# Patient Record
Sex: MALE | Race: BLACK OR AFRICAN AMERICAN
[De-identification: names, ages, dates, MRNs, and addresses within clinical notes are randomized per-mention and may not be internally consistent; named-entity substitution may affect disease eponyms.]

---

## 2020-09-01 ENCOUNTER — HOSPITAL ENCOUNTER (INPATIENT)
Dept: HOSPITAL 15 - ER | Age: 25
LOS: 3 days | Discharge: HOME | DRG: 52 | End: 2020-09-04
Attending: INTERNAL MEDICINE | Admitting: NURSE PRACTITIONER
Payer: MEDICAID

## 2020-09-01 VITALS — HEIGHT: 78 IN | BODY MASS INDEX: 17.45 KG/M2 | WEIGHT: 150.8 LBS

## 2020-09-01 DIAGNOSIS — Z83.3: ICD-10-CM

## 2020-09-01 DIAGNOSIS — Z53.9: ICD-10-CM

## 2020-09-01 DIAGNOSIS — Z82.49: ICD-10-CM

## 2020-09-01 DIAGNOSIS — R82.71: ICD-10-CM

## 2020-09-01 DIAGNOSIS — Z79.899: ICD-10-CM

## 2020-09-01 DIAGNOSIS — Z91.19: ICD-10-CM

## 2020-09-01 DIAGNOSIS — R00.1: ICD-10-CM

## 2020-09-01 DIAGNOSIS — G93.49: Primary | ICD-10-CM

## 2020-09-01 DIAGNOSIS — F20.2: ICD-10-CM

## 2020-09-01 DIAGNOSIS — F12.90: ICD-10-CM

## 2020-09-01 DIAGNOSIS — R56.9: ICD-10-CM

## 2020-09-01 LAB
ALBUMIN SERPL-MCNC: 4.2 G/DL (ref 3.4–5)
ALP SERPL-CCNC: 48 U/L (ref 45–117)
ALT SERPL-CCNC: 89 U/L (ref 16–61)
ANION GAP SERPL CALCULATED.3IONS-SCNC: 5 MMOL/L (ref 5–15)
BILIRUB SERPL-MCNC: 1.1 MG/DL (ref 0.2–1)
BUN SERPL-MCNC: 16 MG/DL (ref 7–18)
BUN/CREAT SERPL: 13.3
CALCIUM SERPL-MCNC: 9.2 MG/DL (ref 8.5–10.1)
CHLORIDE SERPL-SCNC: 104 MMOL/L (ref 98–107)
CO2 SERPL-SCNC: 29 MMOL/L (ref 21–32)
GLUCOSE SERPL-MCNC: 82 MG/DL (ref 74–106)
HCT VFR BLD AUTO: 42.3 % (ref 41–53)
HGB BLD-MCNC: 14 G/DL (ref 13.5–17.5)
MCH RBC QN AUTO: 27.3 PG (ref 28–32)
MCV RBC AUTO: 82.8 FL (ref 80–100)
NRBC BLD QL AUTO: 0.2 %
POTASSIUM SERPL-SCNC: 3.8 MMOL/L (ref 3.5–5.1)
PROT SERPL-MCNC: 8.3 G/DL (ref 6.4–8.2)
SODIUM SERPL-SCNC: 138 MMOL/L (ref 136–145)

## 2020-09-01 PROCEDURE — 84443 ASSAY THYROID STIM HORMONE: CPT

## 2020-09-01 PROCEDURE — 80320 DRUG SCREEN QUANTALCOHOLS: CPT

## 2020-09-01 PROCEDURE — 80053 COMPREHEN METABOLIC PANEL: CPT

## 2020-09-01 PROCEDURE — 84484 ASSAY OF TROPONIN QUANT: CPT

## 2020-09-01 PROCEDURE — 80307 DRUG TEST PRSMV CHEM ANLYZR: CPT

## 2020-09-01 PROCEDURE — 85025 COMPLETE CBC W/AUTO DIFF WBC: CPT

## 2020-09-01 PROCEDURE — 82805 BLOOD GASES W/O2 SATURATION: CPT

## 2020-09-01 PROCEDURE — 96365 THER/PROPH/DIAG IV INF INIT: CPT

## 2020-09-01 PROCEDURE — 80048 BASIC METABOLIC PNL TOTAL CA: CPT

## 2020-09-01 PROCEDURE — 36415 COLL VENOUS BLD VENIPUNCTURE: CPT

## 2020-09-01 PROCEDURE — 96361 HYDRATE IV INFUSION ADD-ON: CPT

## 2020-09-01 PROCEDURE — 93005 ELECTROCARDIOGRAM TRACING: CPT

## 2020-09-01 PROCEDURE — 87086 URINE CULTURE/COLONY COUNT: CPT

## 2020-09-01 PROCEDURE — 93306 TTE W/DOPPLER COMPLETE: CPT

## 2020-09-01 PROCEDURE — 81001 URINALYSIS AUTO W/SCOPE: CPT

## 2020-09-01 PROCEDURE — 71045 X-RAY EXAM CHEST 1 VIEW: CPT

## 2020-09-01 PROCEDURE — 82542 COL CHROMOTOGRAPHY QUAL/QUAN: CPT

## 2020-09-01 PROCEDURE — 87081 CULTURE SCREEN ONLY: CPT

## 2020-09-01 PROCEDURE — 85610 PROTHROMBIN TIME: CPT

## 2020-09-01 PROCEDURE — 80185 ASSAY OF PHENYTOIN TOTAL: CPT

## 2020-09-01 PROCEDURE — 70551 MRI BRAIN STEM W/O DYE: CPT

## 2020-09-01 PROCEDURE — 70450 CT HEAD/BRAIN W/O DYE: CPT

## 2020-09-01 PROCEDURE — 83735 ASSAY OF MAGNESIUM: CPT

## 2020-09-01 PROCEDURE — 84439 ASSAY OF FREE THYROXINE: CPT

## 2020-09-01 PROCEDURE — 36600 WITHDRAWAL OF ARTERIAL BLOOD: CPT

## 2020-09-01 PROCEDURE — 84481 FREE ASSAY (FT-3): CPT

## 2020-09-02 VITALS — DIASTOLIC BLOOD PRESSURE: 82 MMHG | SYSTOLIC BLOOD PRESSURE: 132 MMHG

## 2020-09-02 LAB
ALCOHOL, URINE: 5 MG/DL (ref 0–10)
AMPHETAMINES UR QL SCN: NEGATIVE
BARBITURATES UR QL SCN: NEGATIVE
BENZODIAZ UR QL SCN: NEGATIVE
BZE UR QL SCN: NEGATIVE
CANNABINOIDS UR QL SCN: POSITIVE
INR PPP: 1.03 (ref 0.9–1.15)
OPIATES UR QL SCN: NEGATIVE
PCP UR QL SCN: NEGATIVE
T3FREE SERPL-MCNC: 2.59 PG/ML (ref 2.3–4.2)
T4 FREE SERPL-MCNC: 1.33 NG/DL (ref 0.89–1.76)

## 2020-09-02 RX ADMIN — DOPAMINE HYDROCHLORIDE IN DEXTROSE SCH MLS/HR: 1.6 INJECTION, SOLUTION INTRAVENOUS at 03:42

## 2020-09-02 RX ADMIN — FAMOTIDINE SCH MG: 20 TABLET, FILM COATED ORAL at 21:53

## 2020-09-02 RX ADMIN — DOPAMINE HYDROCHLORIDE IN DEXTROSE SCH MLS/HR: 1.6 INJECTION, SOLUTION INTRAVENOUS at 19:44

## 2020-09-02 RX ADMIN — FAMOTIDINE SCH MG: 20 TABLET, FILM COATED ORAL at 10:35

## 2020-09-02 NOTE — NUR
Opening Shift Note

Assumed care of patient, awake and alert.  No S/S of distress/SOB or pain noted. Bed is in 
lowest locked position with bed rails up x2 and call light is within reach of the patient.  
Dopamine drip infusing as ordered.  Seizure precautions in place with bed rails padded 
bilaterally for safety. Instructed on POC and to call for assist PRN.

## 2020-09-02 NOTE — NUR
Cardiologist updated on pt's status

Spoke to Dr Tomlin on the phone re pt's current status. Received new orders including 
cancellation of PPI procedure until further evaluation. Order carried out. Notified house 
sup of change of status.

## 2020-09-02 NOTE — NUR
pt received in [facility]

pt slow to respond to questions. pt would not answer whether he understands procedure and 
was unable to give medical history or what medications he takes. however, pt was able to 
verbalize and give his mother's phone number and nodded and states, "okay" for RN to talk to 
his mother on the phone about his medical history.

## 2020-09-02 NOTE — NUR
obtained pt history from family

met pt's mother, Felicia, in [facility]. pt's mother provided brief medical history and 
medication that patient takes.  mother verbalized understanding of pt's status. all 
questions and concerns addressed as best as possible from a RN standpoint.

## 2020-09-02 NOTE — NUR
Acute Otitis Media with Infection (Child)    Your child has a middle ear infection (acute otitis media). It is caused by bacteria or fungi. The middle ear is the space behind the eardrum. The eustachian tube connects the ear to the nasal passage. The eustachian tubes help drain fluid from the ears. They also keep the air pressure equal inside and outside the ears. These tubes are shorter and more horizontal in children. This makes it more likely for the tubes to become blocked. A blockage lets fluid and pressure build up in the middle ear. Bacteria or fungi can grow in this fluid and cause an ear infection. This infection is commonly known as an earache.  The main symptom of an ear infection is ear pain. Other symptoms may include pulling at the ear, being more fussy than usual, decreased appetie, vomiting or diarrhea.Your child’s hearing may also be affected. Your child may have had a respiratory infection first.  An ear infection may clear up on its own. Or your child may need to take medicine. After the infection goes away, your child may still have fluid in the middle ear. It may take weeks or months for this fluid to go away. During that time, your child may have temporary hearing loss. But all other symptoms of the earache should be gone.  Home care  Follow these guidelines when caring for your child at home:  · The health care provider will likely prescribe medicines for pain. The provider may also prescribe antibiotics or antifungals to treat the infection. These may be liquid medicines to give by mouth. Or they may be ear drops. Follow the provider’s instructions for giving these medicines to your child.  · Because ear infections can clear up on their own, the provider may suggest waiting for a few days before giving your child medicines for infection.  · To reduce pain, have your child rest in an upright position. Hot or cold compresses held against the ear may help ease pain.  · Keep the ear dry. Have  CAME ON GURNEY FROM ER, RECEIVED PATIENT ALERT AND ORIENTED X4, NOT IN DISTRESS, CLEAR 
SOUNDS IN BILATERAL UPPER AND LOWER LUNG SOUNDS, RR=16 RYD=040%, DEEP BREATHING AND COUGHING 
WAS ENCOURAGED, DEMONSTRATED AND VERBALIZED UNDERSTANDING, DENIED SHORT OF BREATH AND CHEST 
PAIN AT THIS MOMENT, SB AND SR R=57-68 ON TELE MONITOR, ABDOMEN SOFT WITH ACTIVE BS, LAST 
BM=9/1/20 AS REPORTED, GENERAL SKIN DRY AND INTACT, RADIAL AND PEDAL PULSES PALPABLE, CAP 
REFILL <3 SECONDS, RESTING ON BED, DENIED PAIN, HEAD OF BED ELEVATED, BED ON LOW POSITION, 
RAILS UP X2, CALL LIGHT ON REACH, INFUSING DOPAMINE DRIP ON LT AC IV SITE AS ORDERED, VS 
T=98.4 RR=18, SAT=100%, P=53, XZ=635/82, WILL CONTINUE MONITORING. your child wear a shower cap when bathing.  To help prevent future infections:  · Avoid smoking near your child. Secondhand smoke raises the risk for ear infections in children.  · Make sure your child gets all appropriate vaccinations.  · Do not bottle feed while your baby is lying on his or her back. (This position can cause  middle ear infections because it allows milk to run into the eustacian tubes.)      · If you breastfeed ccontinue until your child is 6-12 months of age.  To apply ear drops:  1. Put the bottle in warm water if the medicine is kept in the refrigerator. Cold drops in the ear are uncomfortable.  2. Have your child lie down on a flat surface. Gently hold your child’s head to one side.  3. Remove any drainage from the ear with a clean tissue or cotton swab. Clean only the outer ear. Don’t put the cotton swab into the ear canal.  4. Straighten the ear canal by gently pulling the earlobe up and back.  5. Keep the dropper a half-inch above the ear canal. This will keep the dropper from becoming contaminated. Put the drops against the side of the ear canal.  6. Have your child stay lying down for 2 to 3 minutes. This gives time for the medicine to enter the ear canal. If your child doesn’t have pain, gently massage the outer ear near the opening.  7. Wipe any extra medicine away from the outer ear with a clean cotton ball.  Follow-up care  Follow up with your child’s healthcare provider as directed. Your child will need to have the ear rechecked to make sure the infection has resolved. Check with your doctor to see when they want to see your child.  Special note to parents  If your child continues to get earaches, he or she may need ear tubes. The provider will put small tubes in your child’s eardrum to help keep fluid from building up. This procedure is a simple and works well.  When to seek medical advice  Unless advised otherwise, call your child's healthcare provider if:  · Your child is 3 months  old or younger and has a fever of 100.4°F (38°C) or higher. Your child may need to see a healthcare provider.  · Your child is of any age and has fevers higher than 104°F (40°C) that come back again and again.  Call your child's healthcare provider for any of the following:  · New symptoms, especially swelling around the ear or weakness of face muscles  · Severe pain  · Infection seems to get worse, not better   · Neck pain  · Your child acts very sick or not themself  · Fever or pain do not improve with antibiotics after 48 hours  © 9216-9546 [employer]. [address] [place] [address], [place] [address]. All rights reserved. This information is not intended as a substitute for professional medical care. Always follow your healthcare professional's instructions.

## 2020-09-03 VITALS — SYSTOLIC BLOOD PRESSURE: 114 MMHG | DIASTOLIC BLOOD PRESSURE: 56 MMHG

## 2020-09-03 VITALS — SYSTOLIC BLOOD PRESSURE: 132 MMHG | DIASTOLIC BLOOD PRESSURE: 70 MMHG

## 2020-09-03 VITALS — SYSTOLIC BLOOD PRESSURE: 117 MMHG | DIASTOLIC BLOOD PRESSURE: 58 MMHG

## 2020-09-03 VITALS — DIASTOLIC BLOOD PRESSURE: 50 MMHG | SYSTOLIC BLOOD PRESSURE: 104 MMHG

## 2020-09-03 VITALS — DIASTOLIC BLOOD PRESSURE: 74 MMHG | SYSTOLIC BLOOD PRESSURE: 129 MMHG

## 2020-09-03 LAB
ANION GAP SERPL CALCULATED.3IONS-SCNC: 5 MMOL/L (ref 5–15)
BUN SERPL-MCNC: 17 MG/DL (ref 7–18)
BUN/CREAT SERPL: 15.6
CALCIUM SERPL-MCNC: 8.9 MG/DL (ref 8.5–10.1)
CHLORIDE SERPL-SCNC: 106 MMOL/L (ref 98–107)
CO2 SERPL-SCNC: 26 MMOL/L (ref 21–32)
GLUCOSE SERPL-MCNC: 94 MG/DL (ref 74–106)
HCT VFR BLD AUTO: 42.4 % (ref 41–53)
HGB BLD-MCNC: 14.3 G/DL (ref 13.5–17.5)
MAGNESIUM SERPL-MCNC: 2 MG/DL (ref 1.6–2.6)
MCH RBC QN AUTO: 27.6 PG (ref 28–32)
MCV RBC AUTO: 82 FL (ref 80–100)
NRBC BLD QL AUTO: 0.2 %
POTASSIUM SERPL-SCNC: 3.9 MMOL/L (ref 3.5–5.1)
SODIUM SERPL-SCNC: 137 MMOL/L (ref 136–145)

## 2020-09-03 RX ADMIN — FAMOTIDINE SCH MG: 20 TABLET, FILM COATED ORAL at 22:18

## 2020-09-03 RX ADMIN — DOPAMINE HYDROCHLORIDE IN DEXTROSE SCH MLS/HR: 1.6 INJECTION, SOLUTION INTRAVENOUS at 22:18

## 2020-09-03 RX ADMIN — FAMOTIDINE SCH MG: 20 TABLET, FILM COATED ORAL at 10:13

## 2020-09-03 NOTE — NUR
Opening Shift Note

Assumed care of patient, awake, alert and oriented X4. No S/S of distress/SOB or pain. Tele# 
34, sinus bradycardia @ 49 bpm. IV to left antecubital, 18 gauge, patent and infusing 
Dopamine @ 9.143 ml/hr and right antecubital, 20 gauge, patent and saline locked. Instructed 
on POC and to call for assist PRN, verbalized understanding. Bed locked, in lowest position, 
call light within reach, seizure precautions in place, will continue to monitor for changes 
Q1hr and PRN.

## 2020-09-04 VITALS — DIASTOLIC BLOOD PRESSURE: 78 MMHG | SYSTOLIC BLOOD PRESSURE: 130 MMHG

## 2020-09-04 VITALS — SYSTOLIC BLOOD PRESSURE: 106 MMHG | DIASTOLIC BLOOD PRESSURE: 56 MMHG

## 2020-09-04 VITALS — DIASTOLIC BLOOD PRESSURE: 50 MMHG | SYSTOLIC BLOOD PRESSURE: 109 MMHG

## 2020-09-04 RX ADMIN — FAMOTIDINE SCH MG: 20 TABLET, FILM COATED ORAL at 09:08

## 2020-09-04 NOTE — NUR
Patient okay for discharge per Dr. Ly



No new discharge prescriptions per MD. Okay patient to have EEG done outpatient. Patient to 
follow up with neurologist in 1 week per MD.

## 2020-09-04 NOTE — NUR
POC discussed with Dr. William SHETTY discussed POC with Dr. Tomlin. 

Dr. Tomlin aware of patient's decreased HR as low as SB 38 bpm while patient is sleeping. Okay 
to discharge per Dr. Tomlin. 

Neurology to clear patient for discharge per Dr. Thomas.

## 2020-09-04 NOTE — NUR
Morning note



Patient resting in bed with even and unlabored respirations, no distress noted. Instructed 
patient on POC, fall precautions and to call for assistance as needed. patient verbalized 
understanding. Fall and seizure precautions in place.

## 2020-09-04 NOTE — NUR
Discharge 



Discharge order and paperwork provided to the patient. Patient verbalized understanding. 
Informed patient that drivers license is suspended until cleared by neurology. Patient 
stated "But I didn't pass out." Informed patient that he is to follow up with neurology RE: 
drivers license. Patient verbalized understanding. (x2) IVs removed with aseptic technique, 
catheters intact. Dressings applied. Patient tolerated well, no trauma to sites. Telemonitor 
removed and returned. Patient reports having all personal belongings. Respirations even and 
unlabored, no distress noted. Patient refused wheelchair. Patient ambulated to hospital 
lobby with a steady gait accompanied by staff member.

## 2020-09-04 NOTE — NUR
Opening Shift Note

Assumed care of patient, awake and A&Ox4.  No S/S of distress/SOB or pain.  Bed rails up x2. 
Bed is in lowest, locked position. Seizure precautions in place. Instructed on POC and to 
call for assist PRN, will continue to monitor for changes Q1hr and PRN.

-------------------------------------------------------------------------------

Signed:    09/04/20 at 1113 by SN CHAYO <Co-Signature Required>

Co-Signed: 09/04/20 at 1113 by Courtney McBurney RN

-------------------------------------------------------------------------------

## 2020-09-04 NOTE — NUR
Patient's mother in hospital lobby requesting to speak with RN



This RN spoke with patient's mother with permission from the patient. Patient's mother 
requesting update on patient's status and POC. Questions and concerns addressed.